# Patient Record
(demographics unavailable — no encounter records)

---

## 2025-02-24 NOTE — PHYSICAL EXAM
[Chaperone Present] : A chaperone was present in the examining room during all aspects of the physical examination [Vulvar Atrophy] : vulvar atrophy [Labia Majora] : were normal [Atrophy] : atrophy [Aa ____] : Aa [unfilled] [Ba ____] : Ba [unfilled] [C ____] : C [unfilled] [GH ____] : GH [unfilled] [PB ____] : PB [unfilled] [TVL ____] : TVL  [unfilled] [Ap ____] : Ap [unfilled] [Bp ____] : Bp [unfilled] [D ____] : D [unfilled] [Normal] : no abnormalities [FreeTextEntry1] :   General: Well, appearing. Alert and orientated. No acute distress HEENT: Normocephalic, atraumatic and extraocular muscles appear to be intact Neck: Full range of motion, no obvious lymphadenopathy, deformities, or masses noted Respiratory: Speaking in full sentences comfortably, normal work of breathing and no cough during visit Musculoskeletal: active full range of motion in extremities Extremities: No upper extremity edema noted Skin: no obvious rash or skin lesions Neuro: Orientated X 3, speech is fluent, normal rate Psych: Normal mood and affect. [Tenderness] : ~T no ~M abdominal tenderness observed [Distended] : not distended [Hernia] : hernia observed

## 2025-02-24 NOTE — PROCEDURE
[FreeTextEntry1] : Sterile straight catheterization was performed to measure a postvoid residual volume which was 10 cc

## 2025-02-24 NOTE — HISTORY OF PRESENT ILLNESS
[Cystocele (Obstetric)] : no [Rectal Prolapse] : no [Unable To Restrain Bowel Movement] : mild [Urinary Tract Infection] : no [Constipation Obstructed Defecation] : mild [Incomplete Emptying Of Stool] : no [Pelvic Pain] : no [Vaginal Pain] : no [] : no [de-identified] : with increase po intake during the day, usually once  [FreeTextEntry1] :  84y presenting for evaluation of pelvic organ prolapse and history of UTIs. Last treatment for UTIs 1 year ago. Patient with CT imaging report concerning for prolapse. Patient denies sensation of vaginal bulge or discomfort. Regarding UTIs today denies increase frequency or dyuria.    PMH: hiatal hernia and umbilical? hernia, HTN, hypothyroid, OP PSH: L hip replacement Rx: Lisinopril, Levothyroxine, Alendronate  Daily fluid intake: 12oz water, 2 cups juice, 1 cup coffee or tea

## 2025-02-24 NOTE — OB HISTORY
[Vaginal ___] : [unfilled] vaginal delivery(s) [Sexually Active] : sexually active [Abnormal Pap Smear] : normal pap smear

## 2025-02-24 NOTE — DISCUSSION/SUMMARY
[FreeTextEntry1] :  We reviewed management options for her prolapse including: observation, pelvic floor exercises with and without PT, pessary, and surgical management. Written IUGA information on prolapse treatment options was also provided to her to review. She denies prolapse symptoms and PVR normal. I recommend continued observation. If she develops symptoms she will RTO for treatment. All questions answered.

## 2025-02-24 NOTE — REASON FOR VISIT
[Initial Visit ___] : an initial visit for [unfilled] [Questionnaire Received] : Patient questionnaire received [Intake Form Reviewed] : Patient intake form with past medical history, surgical history, family history and social history reviewed today [Pelvic Organ Prolapse] : pelvic organ prolapse [Family Member] : family member